# Patient Record
Sex: FEMALE | Race: WHITE | NOT HISPANIC OR LATINO | Employment: OTHER | URBAN - NONMETROPOLITAN AREA
[De-identification: names, ages, dates, MRNs, and addresses within clinical notes are randomized per-mention and may not be internally consistent; named-entity substitution may affect disease eponyms.]

---

## 2017-11-26 ENCOUNTER — HOSPITAL ENCOUNTER (OUTPATIENT)
Dept: RADIOLOGY | Facility: OTHER | Age: 27
End: 2017-11-26
Attending: FAMILY MEDICINE

## 2017-11-26 ENCOUNTER — HISTORY (OUTPATIENT)
Dept: FAMILY MEDICINE | Facility: OTHER | Age: 27
End: 2017-11-26

## 2017-11-26 ENCOUNTER — OFFICE VISIT - GICH (OUTPATIENT)
Dept: FAMILY MEDICINE | Facility: OTHER | Age: 27
End: 2017-11-26

## 2017-11-26 DIAGNOSIS — M25.571 PAIN IN RIGHT ANKLE: ICD-10-CM

## 2017-11-26 DIAGNOSIS — S93.491A SPRAIN OF OTHER LIGAMENT OF RIGHT ANKLE, INITIAL ENCOUNTER: ICD-10-CM

## 2017-12-28 NOTE — PROGRESS NOTES
"Patient Information     Patient Name MRN Sex Hermila Valentin [id number] Female 1990      Progress Notes by Rosalva Stokes DO at 2017  3:45 PM     Author:  Rosalva Stokes DO Service:  (none) Author Type:  PHYS- Osteopathic     Filed:  2017  4:43 PM Encounter Date:  2017 Status:  Signed     :  Rosalva Stokes DO (PHYS- Osteopathic)            SUBJECTIVE:  Hermila Bennett is a 27 y.o. female who presents for ankle pain.    HUMBLE Cleaning is here with her \"best friend\" for R ankle pain x 2 days.  Twisted same ankle ~2 months ago; stepping into a hole.  Yesterday - was walking along (flat surface) and ankle gave out/inverted underneath her.  She was able to put ~50% of her body weight on it immediately; and continues to be about the same.  Most pain at Lateral malleolus.  Ice on it yesterday.    No Known Allergies,   No current outpatient prescriptions on file prior to visit.     No current facility-administered medications on file prior to visit.     and No past medical history on file.    REVIEW OF SYSTEMS:  Review of Systems   All other systems reviewed and are negative.      OBJECTIVE:  /82  Pulse 76  Temp 98.5  F (36.9  C) (Tympanic)  Ht 1.645 m (5' 4.75\")  Wt 104.1 kg (229 lb 6.4 oz)  LMP 2017 (Exact Date)  Breastfeeding? No  BMI 38.47 kg/m2    EXAM:   Physical Exam   Constitutional: She is well-developed, well-nourished, and in no distress.   HENT:   Head: Normocephalic and atraumatic.   Right Ear: External ear normal.   Left Ear: External ear normal.   Eyes: EOM are normal. Pupils are equal, round, and reactive to light.   Musculoskeletal:        Right ankle: She exhibits swelling and ecchymosis. She exhibits normal range of motion, no deformity, no laceration and normal pulse. Tenderness. Lateral malleolus and AITFL tenderness found. No medial malleolus, no CF ligament, no posterior TFL, no head of 5th metatarsal and no proximal fibula tenderness " "found.   Nursing note and vitals reviewed.    Ankle XR: No acute fracture  Radiology read: Pending  I personally reviewed images with Hermila and her \"best friend.\"    ASSESSMENT/PLAN:    ICD-10-CM    1. Acute right ankle pain M25.571 XR ANKLE 3 VIEWS RIGHT        Plan:   Reassurance provided re: no fracture.  RICE therapy.  Brace provided and encouraged for stability.  Will notify if radiology sees anything different.  Follow up if not improving.          "

## 2017-12-28 NOTE — ADDENDUM NOTE
Patient Information     Patient Name MRN Sex Hermila Valentin [id number] Female 1990      Addendum Note by Rosalva Fontenot DO at 2017  4:47 PM     Author:  Rosalva Fontenot DO Service:  (none) Author Type:  PHYS- Osteopathic     Filed:  2017  4:47 PM Encounter Date:  2017 Status:  Signed     :  Rosalva Fontenot DO (PHYS- Osteopathic)       Addended by: ROSALVA FONTENOT on: 2017 04:47 PM        Modules accepted: Orders

## 2017-12-30 NOTE — NURSING NOTE
Patient Information     Patient Name MRN Hermila Shoemaker [id number] Female 1990      Nursing Note by Yuni Pearl at 2017  3:45 PM     Author:  Yuni Pearl Service:  (none) Author Type:  (none)     Filed:  2017  4:10 PM Encounter Date:  2017 Status:  Signed     :  Yuni Pearl            Patient presents today for right ankle injury. Patient was walking home last night and ankle gave out on her, it kind of buckled, as she states. Patients foot and ankle are swollen, and she is having trouble walking on it.    Yuni Pearl LPN..............2017 4:03 PM

## 2018-01-25 VITALS
DIASTOLIC BLOOD PRESSURE: 82 MMHG | BODY MASS INDEX: 38.22 KG/M2 | HEART RATE: 76 BPM | SYSTOLIC BLOOD PRESSURE: 114 MMHG | WEIGHT: 229.4 LBS | HEIGHT: 65 IN | TEMPERATURE: 98.5 F

## 2018-02-08 ENCOUNTER — DOCUMENTATION ONLY (OUTPATIENT)
Dept: FAMILY MEDICINE | Facility: OTHER | Age: 28
End: 2018-02-08

## 2019-01-23 ENCOUNTER — OFFICE VISIT (OUTPATIENT)
Dept: FAMILY MEDICINE | Facility: OTHER | Age: 29
End: 2019-01-23
Attending: FAMILY MEDICINE
Payer: COMMERCIAL

## 2019-01-23 VITALS
BODY MASS INDEX: 43.05 KG/M2 | DIASTOLIC BLOOD PRESSURE: 64 MMHG | TEMPERATURE: 97.8 F | HEIGHT: 63 IN | WEIGHT: 243 LBS | HEART RATE: 80 BPM | SYSTOLIC BLOOD PRESSURE: 118 MMHG

## 2019-01-23 DIAGNOSIS — M54.6 ACUTE LEFT-SIDED THORACIC BACK PAIN: Primary | ICD-10-CM

## 2019-01-23 PROCEDURE — 99213 OFFICE O/P EST LOW 20 MIN: CPT | Performed by: FAMILY MEDICINE

## 2019-01-23 PROCEDURE — G0463 HOSPITAL OUTPT CLINIC VISIT: HCPCS | Performed by: FAMILY MEDICINE

## 2019-01-23 ASSESSMENT — PAIN SCALES - GENERAL: PAINLEVEL: MODERATE PAIN (5)

## 2019-01-23 ASSESSMENT — ENCOUNTER SYMPTOMS
MYALGIAS: 1
BACK PAIN: 1
NECK PAIN: 0
JOINT SWELLING: 0
CONSTITUTIONAL NEGATIVE: 1
ARTHRALGIAS: 0
NECK STIFFNESS: 0

## 2019-01-23 ASSESSMENT — MIFFLIN-ST. JEOR: SCORE: 1801.37

## 2019-01-23 NOTE — PROGRESS NOTES
"Nursing Notes:   Cata Fontenot LPN  1/23/2019 10:30 AM  Signed  Patient presents to clinic with pain on back near left shoulder blade area. She states that no known injury and this pain started Monday.  Cata Carr ....................  1/23/2019   10:25 AM      SUBJECTIVE:     HUMBLE   Hermila Bennett is a 28 year old female who presents with her mother to clinic today for pain in her left scapular region. 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 She does not recall any other injury to the area. She explains the pain comes and goes throughout the day. She has not noticed if any particular movements make it worse. She has not had any back pain prior to this. She has taken OTC Ibuprofen to help with symptoms but has not tried stretching.   Social History     Social History Narrative    Student at [employer] studying early childhood.     Works part-time at  center and as a nanny    Lives with parents and sister in [place]. No significant other.       Review of Systems   Constitutional: Negative.    Musculoskeletal: Positive for back pain and myalgias. Negative for arthralgias, gait problem, joint swelling, neck pain and neck stiffness.        OBJECTIVE:     /64   Pulse 80   Temp 97.8  F (36.6  C)   Ht 1.6 m (5' 3\")   Wt 110.2 kg (243 lb)   LMP 12/31/2018   Breastfeeding? No   BMI 43.05 kg/m    Body mass index is 43.05 kg/m .  Physical Exam  General: Pleasant, in no apparent distress.   Musculoskeletal: Tenderness to palpation of posterolateral ribs around the level of T7. No changes in ROM of back of shoulders.   Psych: Appropriate mood and affect.       ASSESSMENT/PLAN:     1. Acute left-sided thoracic back pain      Discussed with patient that she is likely experiencing pain due to a subluxed rib from lifting at work. Encouraged use of OTC tylenol or ibuprofen for symptomatic relief. Discussed " with patient that she may benefit from working with a chiropractor.      Reminded patient that she is overdue for a pap smear. Patient will schedule a physical with a pap smear at her convenience.     LEONCIO Stephens  I was present with the physician assistant student who participated in the service and in the documentation of this note.  I have verified the history and personally performed a physical exam and medical decision making, and have verified the content of the note, which accurately reflects my assessment of the patient and the plan of care.    Marry Storey MD  [facility]

## 2019-01-23 NOTE — NURSING NOTE
Patient presents to clinic with pain on back near left shoulder blade area. She states that no known injury and this pain started Monday.  Cata Carr ....................  1/23/2019   10:25 AM

## 2019-01-29 ENCOUNTER — THERAPY VISIT (OUTPATIENT)
Dept: CHIROPRACTIC MEDICINE | Facility: OTHER | Age: 29
End: 2019-01-29
Attending: CHIROPRACTOR
Payer: COMMERCIAL

## 2019-01-29 DIAGNOSIS — M54.6 ACUTE LEFT-SIDED THORACIC BACK PAIN: ICD-10-CM

## 2019-01-29 DIAGNOSIS — M99.02 SEGMENTAL AND SOMATIC DYSFUNCTION OF THORACIC REGION: Primary | ICD-10-CM

## 2019-01-29 DIAGNOSIS — M62.838 MUSCLE SPASM: ICD-10-CM

## 2019-01-29 DIAGNOSIS — M99.01 SEGMENTAL AND SOMATIC DYSFUNCTION OF CERVICAL REGION: ICD-10-CM

## 2019-01-29 DIAGNOSIS — M54.2 CERVICALGIA: ICD-10-CM

## 2019-01-29 PROCEDURE — G0463 HOSPITAL OUTPT CLINIC VISIT: HCPCS | Performed by: CHIROPRACTOR

## 2019-01-29 PROCEDURE — 99202 OFFICE O/P NEW SF 15 MIN: CPT | Mod: 25 | Performed by: CHIROPRACTOR

## 2019-01-29 PROCEDURE — 98940 CHIROPRACT MANJ 1-2 REGIONS: CPT | Mod: AT | Performed by: CHIROPRACTOR

## 2019-01-29 NOTE — PROGRESS NOTES
PATIENT:  Hermila Bennett is a 28 year old  female presenting for Left midback and neck pain referred by Marry Storey MD.  PROBLEM:   Date of Initial Visit for this Episode:  1/29/2019 1/29/2019  Visit #1/3    SUBJECTIVE / HPI:   Description and onset:  Sharp lower scapular pain x 10 days. Tried recommendations after visit last week with Dr. Storey in past week, but staying the same.    Duration and Frequency of Pain:  Intermittent  Radiation of pain: into upper neck across base of skull  Pain rated at it's worst: 8/10  Pain rated currently:  7-8/10, sharp, miserable, gnawing, fierce, piercing  Pain course: Not changing last week rated pain 5/10 moderate.  No improvement with treatment.    Worse with:  Sitting, sleeping, lifting kids sometimes.   Improved by:  Nothing  Additional Features: No numbness or weakness.     Other Health Care Providers seen for this: Cordelia  Previous treatment: no help with ice, heat, ibuprofen, tylenol.  Previous injury:none    See flowsheets in chart for details.  1/29/2019  Neck index Not assessed today.     Oswestry Back index 30%    Functional limitations:  Reduce pain, be able to lift kids, sit up to an 1 hr., sleep 8 hrs/night    Exercise habits:  Not much  Sleeping habits: 8 hrs/night (pre-injury)  Past D.C. Care: no       Health History as reported by the patient: Good    PAST MEDICAL HISTORY:  No past medical history on file.    PAST SURGICAL HISTORY:  Past Surgical History:   Procedure Laterality Date     LAPAROSCOPIC APPENDECTOMY  05/05/2012     Dr. Hebert       ALLERGIES:  No Known Allergies    CURRENT MEDICATIONS:  No current outpatient medications on file.       SOCIAL HISTORY:  Marital Status: single (never ).  Children: no.  Occupation: nanny  Alcohol use:Occassional.  Tobacco use: Smoker: no.  Are you or have you used illicit drugs:  no.    FAMILY HISTORY:  Family History   Problem Relation Age of Onset     Family History Negative Mother         Good  "Health     Heart Disease Father 50        Heart Disease,MI     Family History Negative Sister         Good Health     Breast Cancer No family hx of         Cancer-breast       Patient Active Problem List   Diagnosis     Obesity         ROS:  The patient denies any fevers, chills, nausea, vomiting, diarrhea, constipation,dysuria, hematuria, or urinary hesitancy or incontinence.  No shortness of breath, chest pain, or rashes.    OBJECTIVE:    DIAGNOSTICS:  No warranted current spinal imaging taken.     PHYSICAL EXAM:     GENERAL APPEARANCE: healthy, alert and no distress   SKIN: no suspicious lesions or rashes  NEURO: cranial nerves 2-12 intact, normal gait  PSYCH:  mentation appears normal and affect normal/bright    MUSCULOSKELETAL:   Posture: mild forward head, rounded shoulders bilateral    Cervical  AROM:  Normal except 70/85 L Rotation +neck pain    Tenderness: L C1, R C5   Muscle spasm:  Suboccipital,  trapezius  Motion restriction: C1 in left lateral flexion, C5 in left rotation   -Maximal Foraminal Compression: on L \"improves back pain\"; negative right    - Shoulder Depression: improves   - Distraction: improves     Thoracic  AROM:   Restricted 30/45 LR +sharp left scapular pain   +Kemps: +T4-7  Tenderness: T3-4, T7-8, PRimary is L 7th costovertebral jt.  Muscle spasm:  Trapezius, rhomboids, extensor paraspinals  Motion restriction: T4 in extension and left rotation, T7 in right rotation and extension     +Joint asymmetry and restriction: C1 RLF, C5 R, T4 R/flex, T7 L/flex and  L 7th costovertebral jt. 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   No diagnosis found.    PLAN    History and Examination    Procedures:  Modalities:  None performed this visit    CMT:  99727 Chiropractic manipulative treatment 1-2 regions performed "   Cervical: Diversified, C1 , C5 , Supine  Thoracic: Diversified, T4, T7, Prone    Therapeutic procedures:  None  Stretches - Reviewed stretches doing general ROM done on examtoday .   Gave patient Ice instructions post adjustment, and instructions for acute care    Response to Treatment:  Reduction in symptoms as reported by patient    Prognosis: Excellent    1/29/2019 Plan of Care:  2-3 visits of Chiropractic Care including Spinal Adjustments and/or physiotherapy and active rehabilitation, to include exercises in the office and/or at home to meet care plan goals.     Frequency: 2xweek for up to 2 weeks. A reevaluation would be clinically appropriate in 2-3 visits, to determine progress and further course of care.    POC discussed and patient agreeable to plan of care.      1/29/2019 Goals:   See Assessment.   Patient will report improved pain.   Patient will demonstrate an improved ability to complete Activities of Daily Living  as shown by a reported 10-30% reduced score on neck and/or back index.    Patient will demonstrate improved ROM.        INSTRUCTIONS   Patient Instructions   Use ice/heat, stretching ROM neck and upper back.  Return 1-2 more visits within next week.

## 2019-01-29 NOTE — Clinical Note
Christiano Tuttle, Excellent referral for chiropractic adjustments!  Should just take 1-3 visits. Thank you, Qing

## 2019-01-30 ENCOUNTER — OFFICE VISIT (OUTPATIENT)
Dept: OBGYN | Facility: OTHER | Age: 29
End: 2019-01-30
Attending: OBSTETRICS & GYNECOLOGY
Payer: COMMERCIAL

## 2019-01-30 VITALS
SYSTOLIC BLOOD PRESSURE: 126 MMHG | BODY MASS INDEX: 42.8 KG/M2 | WEIGHT: 241.56 LBS | HEART RATE: 60 BPM | HEIGHT: 63 IN | DIASTOLIC BLOOD PRESSURE: 74 MMHG

## 2019-01-30 DIAGNOSIS — Z00.00 ANNUAL PHYSICAL EXAM: Primary | ICD-10-CM

## 2019-01-30 DIAGNOSIS — Z23 NEED FOR TETANUS BOOSTER: ICD-10-CM

## 2019-01-30 DIAGNOSIS — Z12.4 SCREENING FOR CERVICAL CANCER: ICD-10-CM

## 2019-01-30 PROCEDURE — G0123 SCREEN CERV/VAG THIN LAYER: HCPCS | Performed by: OBSTETRICS & GYNECOLOGY

## 2019-01-30 PROCEDURE — 99385 PREV VISIT NEW AGE 18-39: CPT | Mod: 25 | Performed by: OBSTETRICS & GYNECOLOGY

## 2019-01-30 PROCEDURE — 90715 TDAP VACCINE 7 YRS/> IM: CPT | Performed by: OBSTETRICS & GYNECOLOGY

## 2019-01-30 PROCEDURE — 90471 IMMUNIZATION ADMIN: CPT | Performed by: OBSTETRICS & GYNECOLOGY

## 2019-01-30 ASSESSMENT — PAIN SCALES - GENERAL: PAINLEVEL: NO PAIN (0)

## 2019-01-30 ASSESSMENT — MIFFLIN-ST. JEOR: SCORE: 1794.85

## 2019-01-30 NOTE — PROGRESS NOTES
"Annual Exam    HPI:    Hermila Bennett is a 28 year old , here for well woman exam.  She has no concerns. She was having back pain but it improved after seeing a chiropractor. She has regular monthly menses. She is not currently sexually active, although has been in the past. Denies concerns for STIs or desire for testing. She is not on contraception and declines a need to start.     OBHx  Obstetric History       T0      L0     SAB0   TAB0   Ectopic0   Multiple0   Live Births0           PMHx: obesity  PSHx:   Past Surgical History:   Procedure Laterality Date     LAPAROSCOPIC APPENDECTOMY  2012     Dr. Hebert      Meds:   No current outpatient medications on file.     No current facility-administered medications for this visit.      Allergies:  NKDA    SocHx:   Social History     Tobacco Use     Smoking status: Never Smoker     Smokeless tobacco: Never Used   Substance Use Topics     Alcohol use: Yes     Comment: occasionally     Drug use: No     Lives with her mom and sister. 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    FamHx:   Family History   Problem Relation Age of Onset     Family History Negative Mother         Good Health     Heart Disease Father 50        Heart Disease,MI     Family History Negative Sister         Good Health     Breast Cancer No family hx of         Cancer-breast        ROS: 10-Point ROS negative except as noted in HPI    Physical Exam  /74 (BP Location: Right arm, Patient Position: Sitting, Cuff Size: Adult Large)   Pulse 60   Ht 1.6 m (5' 3\")   Wt 109.6 kg (241 lb 9 oz)   LMP 2018   Breastfeeding? No   BMI 42.79 kg/m    Gen: Well-appearing, NAD  HEENT: Normocephalic, atraumatic  Neck: Thyroid is not enlarged, no appreciable masses palpated. Non-tender  CV:  RRR, no m/r/g auscultated  Pulm: CTAB, no w/r/r auscultated  Abd: Soft, non-tender, non-distended, obese  Ext: No LE edema, extremities warm and well perfused    Breast: Symmetric, no nipple " discharge or retraction, no axillary lymphadenopathy, no palpable nodules or masses bilaterally. Bilaterally dense breast tissue.     Pelvic:  Normal appearing external female genitalia. Normal hair distribution. Vagina is without lesions. Small white discharge. Cervix normal, no lesions, no cervical motion tenderness. Uterus is small, mobile, non-tender. No adnexal tenderness or masses. Bimanual exam limited by body habitus    Pap collected      --Ideal BMI: 18.5-24.9  Current BMI: Body mass index is 42.79 kg/m .  --Underweight = <18.5  --Normal weight = 18.5-24.9  --Overweight = 25-29.9  --Obesity = >30    Assessment/Plan  Hermila Bennett is a 28 year old  female here for annual exam.     1. Routine Health Maintenance:   -Due for Tdap, none since     2. Cervical cancer screening: pap collected today. If NILM, next due in 5 years    Follow Up: annually or sooner william Bernstein MD  OB/GYN  2019 10:34 AM

## 2019-01-30 NOTE — NURSING NOTE
"Chief Complaint   Patient presents with     Physical       Initial /74 (BP Location: Right arm, Patient Position: Sitting, Cuff Size: Adult Large)   Pulse 60   Ht 1.6 m (5' 3\")   Wt 109.6 kg (241 lb 9 oz)   LMP 12/31/2018   Breastfeeding? No   BMI 42.79 kg/m   Estimated body mass index is 42.79 kg/m  as calculated from the following:    Height as of this encounter: 1.6 m (5' 3\").    Weight as of this encounter: 109.6 kg (241 lb 9 oz).  Medication Reconciliation: tena Ring LPN     Prior to injection, verified patient identity using patient's name and date of birth.  Due to injection administration, patient instructed to remain in clinic for 15 minutes  afterwards, and to report any adverse reaction to me immediately.    boostrix     Drug Amount Wasted:  None.  Vial/Syringe: Syringe  Expiration Date:  05/03/20    "

## 2019-02-04 ENCOUNTER — THERAPY VISIT (OUTPATIENT)
Dept: CHIROPRACTIC MEDICINE | Facility: OTHER | Age: 29
End: 2019-02-04
Attending: CHIROPRACTOR
Payer: COMMERCIAL

## 2019-02-04 DIAGNOSIS — M62.838 MUSCLE SPASM: ICD-10-CM

## 2019-02-04 DIAGNOSIS — M99.01 SEGMENTAL AND SOMATIC DYSFUNCTION OF CERVICAL REGION: ICD-10-CM

## 2019-02-04 DIAGNOSIS — M99.02 SEGMENTAL AND SOMATIC DYSFUNCTION OF THORACIC REGION: Primary | ICD-10-CM

## 2019-02-04 DIAGNOSIS — M54.2 CERVICALGIA: ICD-10-CM

## 2019-02-04 DIAGNOSIS — M54.6 ACUTE LEFT-SIDED THORACIC BACK PAIN: ICD-10-CM

## 2019-02-04 LAB
COPATH REPORT: NORMAL
PAP: NORMAL

## 2019-02-04 PROCEDURE — 98940 CHIROPRACT MANJ 1-2 REGIONS: CPT | Mod: AT | Performed by: CHIROPRACTOR

## 2019-02-04 NOTE — PROGRESS NOTES
"2/4/2019   Visit #:  2    Subjective:  Hermila Bennett is a 28 year old female who is seen in f/u up for:      Segmental and somatic dysfunction of thoracic region  Acute left-sided thoracic back pain  Segmental and somatic dysfunction of cervical region  Cervicalgia  Muscle spasm.     Since last visit on 1/29/2019,  Hermila Bennett reports: \"She feels better than she has in a long time\". She's now able to sleep well for 8 or more hours.    Area of chief complaint:  Cervical and Thoracic :  Symptoms are graded at 0/10.  Since last visit the patient feels that they are nearly 100 percent   from last visit.       Objective:  The following was observed:    P: palpatory tenderness T-spine paraspinal and focal levels:    A: intersegmental range of motion restriction: cervical, thoracic  R: motion palpation notes restricted motion cervical, thoracic  T: hypertonicity at: T-spine paraspinal and Traps Bilaterally    +Joint asymmetry and restriction: C1 RLF, C5 R, T4 R/flex, T8 L/flex      Assessment: She responded very quickly in symptom relief after first visit, will plan for 1 more visit to reduce muscle spasm.    Diagnoses:      1. Segmental and somatic dysfunction of thoracic region    2. Acute left-sided thoracic back pain    3. Segmental and somatic dysfunction of cervical region    4. Cervicalgia    5. Muscle spasm        Patient's condition:  Patient had restrictions pre-manipulation    Treatment effectiveness:  Post manipulation there is better intersegmental movement, Patients symptoms are getting better, Hypertonicity is decreasing and ADL are improving      Procedures:  CMT:  39306 Chiropractic manipulative treatment 1-2 regions performed   Cervical: Diversified, C1 , C5 , Supine  Thoracic: Diversified, T4, T8, Prone    Modalities:  None performed this visit    Therapeutic procedures:  None    Response to Treatment  Reduction in symptoms as reported by patient    Prognosis: Excellent    Progress towards Goals: " Patient is making progress towards the goal.     Recommendations:  Patient Instructions   Use ice/heat, stretching ROM neck and upper back.  Return 1-2 more visits within next week.

## 2019-02-11 ENCOUNTER — THERAPY VISIT (OUTPATIENT)
Dept: CHIROPRACTIC MEDICINE | Facility: OTHER | Age: 29
End: 2019-02-11
Attending: CHIROPRACTOR
Payer: COMMERCIAL

## 2019-02-11 DIAGNOSIS — M99.02 SEGMENTAL AND SOMATIC DYSFUNCTION OF THORACIC REGION: Primary | ICD-10-CM

## 2019-02-11 DIAGNOSIS — M99.01 SEGMENTAL AND SOMATIC DYSFUNCTION OF CERVICAL REGION: ICD-10-CM

## 2019-02-11 PROCEDURE — 99212 OFFICE O/P EST SF 10 MIN: CPT | Mod: 25 | Performed by: CHIROPRACTOR

## 2019-02-11 PROCEDURE — 98940 CHIROPRACT MANJ 1-2 REGIONS: CPT | Mod: AT | Performed by: CHIROPRACTOR

## 2019-02-11 PROCEDURE — G0463 HOSPITAL OUTPT CLINIC VISIT: HCPCS | Performed by: CHIROPRACTOR

## 2019-02-11 NOTE — PROGRESS NOTES
"2/11/2019   Visit #:  3    Subjective:  Hermila Bennett is a 28 year old female who is seen in f/u up for:      Segmental and somatic dysfunction of thoracic region  Segmental and somatic dysfunction of cervical region.     Since last visit on 2/4/2019,  Hermila Bennett reports: She has no further left midback pain and can do all daily activities. \"It is so nice to have my life back\".    Area of chief complaint:  Cervical and Thoracic :  Symptoms are graded at 0/10.  Since last visit the patient feels that they are 100 percent improved from last visit. Initial 1/29/19 30%.    Oswestry (JIL) Questionnaire    OSWESTRY DISABILITY INDEX 2/11/2019   Count 10   Sum 0   Oswestry Score (%) 0   Some recent data might be hidden            Objective:    2/11/2019 Brief re-eval  The following was observed:    P: palpatory tenderness: slight at noted levels   A: intersegmental range of motion restriction: cervical, thoracic  R: motion palpation notes restricted motion cervical, thoracic   AROM: mild left lateral flexion of cervical restricted compared  to right.   T: hypertonicity at: T-spine paraspinal and at focal cervical levels:  Bilaterally  S:  MFC: negative to right and left.  +Joint asymmetry and restriction: C1 RLF, C5 R, T4 R/flex, T8 L/flex      Assessment: She responded very quickly in symptom relief after first visit, muscle spasm reduced to focal levels of segmental dysfunction. Discharging to self management with stretching and strengthening postural muscles.      Diagnoses:      1. Segmental and somatic dysfunction of thoracic region    2. Segmental and somatic dysfunction of cervical region        Patient's condition: Patient had restrictions pre-manipulation, IMPROVED FUNCTION AND SYMPTOMS.    Treatment effectiveness:  Post manipulation there is better intersegmental movement, Patients symptoms are better, Hypertonicity is decreasing and ADLs are not interfered with.    Procedures:  CMT:  06784 Chiropractic " manipulative treatment 1-2 regions performed   Cervical: Diversified, C1 , C5 , Supine  Thoracic: Diversified, T4, T8, Prone    Modalities:  None performed this visit    Therapeutic procedures:  None    Response to Treatment  Reduction in symptoms and improved restriction as reported by patient    Prognosis: Excellent    Progress towards Goals: Patient has met all goals.     Recommendations:   Patient Instructions   She responded very quickly in symptom relief after first visit, muscle spasm reduced to focal levels of segmental dysfunction. Discharging to self management with stretching and strengthening postural muscles.

## 2019-02-11 NOTE — PATIENT INSTRUCTIONS
She responded very quickly in symptom relief after first visit, muscle spasm reduced to focal levels of segmental dysfunction. Discharging to self management with stretching and strengthening postural muscles.

## 2021-06-16 ENCOUNTER — OFFICE VISIT (OUTPATIENT)
Dept: FAMILY MEDICINE | Facility: OTHER | Age: 31
End: 2021-06-16
Attending: NURSE PRACTITIONER

## 2021-06-16 VITALS
BODY MASS INDEX: 46.87 KG/M2 | HEART RATE: 81 BPM | HEIGHT: 63 IN | RESPIRATION RATE: 16 BRPM | TEMPERATURE: 98.6 F | OXYGEN SATURATION: 98 % | DIASTOLIC BLOOD PRESSURE: 86 MMHG | SYSTOLIC BLOOD PRESSURE: 134 MMHG | WEIGHT: 264.5 LBS

## 2021-06-16 DIAGNOSIS — R21 FACIAL RASH: Primary | ICD-10-CM

## 2021-06-16 PROCEDURE — 99213 OFFICE O/P EST LOW 20 MIN: CPT | Performed by: NURSE PRACTITIONER

## 2021-06-16 RX ORDER — CEPHALEXIN 500 MG/1
500 CAPSULE ORAL 2 TIMES DAILY
Qty: 14 CAPSULE | Refills: 0 | Status: SHIPPED | OUTPATIENT
Start: 2021-06-16 | End: 2021-06-23

## 2021-06-16 ASSESSMENT — MIFFLIN-ST. JEOR: SCORE: 1888.89

## 2021-06-16 ASSESSMENT — PAIN SCALES - GENERAL: PAINLEVEL: SEVERE PAIN (6)

## 2021-06-16 NOTE — NURSING NOTE
"Chief Complaint   Patient presents with     Derm Problem     left side of face     Rash on Left side of face just suddenly came on about an hour ago. Patient denies using different make up, laundry detergent, lotion.    /86 (BP Location: Right arm, Patient Position: Sitting, Cuff Size: Adult Large)   Pulse 81   Temp 98.6  F (37  C) (Tympanic)   Resp 16   Ht 1.6 m (5' 3\")   Wt 120 kg (264 lb 8 oz)   LMP 06/02/2021 (Exact Date)   SpO2 98%   Breastfeeding No   BMI 46.85 kg/m        Medication Reconciliation: complete    Clair Galindo LPN    "

## 2021-06-16 NOTE — PROGRESS NOTES
"HPI:    Hermila Bennett is a 30 year old female  who presents to [facility] today for facial rash.    Left cheek with rash that started a few hours ago.  Rash is itchy and painful.    No new food, medications, makeup, facial wash soap, lotion, detergents, sunscreen, etc  She is confident she did not get stung or bit by any insects.  No known tick bites.  No facial weakness, numbness or tingling.    No fevers or feeling feverish.  No headaches.  No vision change.  No sinus congestion or pain.  No dental pain or painful chewing.    No OTC medications or topical treatments        History reviewed. No pertinent past medical history.  Past Surgical History:   Procedure Laterality Date     LAPAROSCOPIC APPENDECTOMY  05/05/2012     Dr. Hebert     Social History     Tobacco Use     Smoking status: Never Smoker     Smokeless tobacco: Never Used   Substance Use Topics     Alcohol use: Yes     Comment: occasionally     No current outpatient medications on file.     No Known Allergies      Past medical history, past surgical history, current medications and allergies reviewed and accurate to the best of my knowledge.        ROS:  Refer to [facility]    /86 (BP Location: Right arm, Patient Position: Sitting, Cuff Size: Adult Large)   Pulse 81   Temp 98.6  F (37  C) (Tympanic)   Resp 16   Ht 1.6 m (5' 3\")   Wt 120 kg (264 lb 8 oz)   LMP 06/02/2021 (Exact Date)   SpO2 98%   Breastfeeding No   BMI 46.85 kg/m      EXAM:  General Appearance: Well appearing adult female, appropriate appearance for age. No acute distress  Eyes: conjunctivae normal without erythema or irritation, corneas clear, no drainage or crusting, no eyelid swelling, pupils equal   Orophayrnx: voice clear.    Nose:  No noted drainage or congestion   Neck: supple without adenopathy  Respiratory: normal chest wall and respirations.  Normal effort.  No cough appreciated.  Musculoskeletal:  Equal movement of bilateral upper extremities.  Equal movement of " bilateral lower extremities.  Normal gait.    Dermatological: left facial cheek with 3.5 cm round erythematous non raised skin area with mild tenderness to palpation along the nose border with mild firmness, no palpable induration or fluctuance, surrounding multiple black heads on nose and few acne pustules on chin.    Psychological: normal affect, alert, oriented, and pleasant.           ASSESSMENT/PLAN:    I have reviewed the nursing notes.  I have reviewed the findings, diagnosis, plan and need for follow up with the patient.    1. Facial rash    - cephALEXin (KEFLEX) 500 MG capsule; Take 1 capsule (500 mg) by mouth 2 times daily for 7 days  Dispense: 14 capsule; Refill: 0    Possible mild bacterial skin infection on left facial cheek.    No known allergic causes for rash.  No known insect bites/stings or tick bites.    Recommend OTC Benadryl PRN itching  Recommend OTC Ibuprofen PRN pain/swelling    Discussed warning signs/symptoms indicative of need to f/u  Follow up if symptoms persist or worsen or concerns      I explained my diagnostic considerations and recommendations to the patient, who voiced understanding and agreement with the treatment plan. All questions were answered. We discussed potential side effects of any prescribed or recommended therapies, as well as expectations for response to treatments.

## 2021-08-24 ENCOUNTER — ALLIED HEALTH/NURSE VISIT (OUTPATIENT)
Dept: FAMILY MEDICINE | Facility: OTHER | Age: 31
End: 2021-08-24
Attending: FAMILY MEDICINE
Payer: OTHER GOVERNMENT

## 2021-08-24 DIAGNOSIS — J02.9 SORE THROAT: Primary | ICD-10-CM

## 2021-08-24 LAB — SARS-COV-2 RNA RESP QL NAA+PROBE: NEGATIVE

## 2021-08-24 PROCEDURE — U0005 INFEC AGEN DETEC AMPLI PROBE: HCPCS | Mod: ZL

## 2021-08-24 PROCEDURE — C9803 HOPD COVID-19 SPEC COLLECT: HCPCS

## 2021-10-09 ENCOUNTER — HEALTH MAINTENANCE LETTER (OUTPATIENT)
Age: 31
End: 2021-10-09

## 2022-09-17 ENCOUNTER — HEALTH MAINTENANCE LETTER (OUTPATIENT)
Age: 32
End: 2022-09-17

## 2023-01-28 ENCOUNTER — HEALTH MAINTENANCE LETTER (OUTPATIENT)
Age: 33
End: 2023-01-28

## 2024-01-26 ENCOUNTER — OFFICE VISIT (OUTPATIENT)
Dept: FAMILY MEDICINE | Facility: OTHER | Age: 34
End: 2024-01-26
Attending: STUDENT IN AN ORGANIZED HEALTH CARE EDUCATION/TRAINING PROGRAM

## 2024-01-26 VITALS
TEMPERATURE: 98.1 F | OXYGEN SATURATION: 97 % | HEIGHT: 63 IN | SYSTOLIC BLOOD PRESSURE: 122 MMHG | RESPIRATION RATE: 16 BRPM | BODY MASS INDEX: 47.32 KG/M2 | WEIGHT: 267.1 LBS | DIASTOLIC BLOOD PRESSURE: 78 MMHG | HEART RATE: 77 BPM

## 2024-01-26 DIAGNOSIS — R21 RASH: Primary | ICD-10-CM

## 2024-01-26 PROCEDURE — 99213 OFFICE O/P EST LOW 20 MIN: CPT | Performed by: STUDENT IN AN ORGANIZED HEALTH CARE EDUCATION/TRAINING PROGRAM

## 2024-01-26 RX ORDER — PREDNISONE 20 MG/1
TABLET ORAL
Qty: 20 TABLET | Refills: 0 | Status: SHIPPED | OUTPATIENT
Start: 2024-01-26

## 2024-01-26 RX ORDER — TRIAMCINOLONE ACETONIDE 1 MG/G
OINTMENT TOPICAL 2 TIMES DAILY
Qty: 80 G | Refills: 1 | Status: SHIPPED | OUTPATIENT
Start: 2024-01-26 | End: 2024-02-02

## 2024-01-26 ASSESSMENT — PAIN SCALES - GENERAL: PAINLEVEL: NO PAIN (0)

## 2024-01-26 NOTE — PATIENT INSTRUCTIONS
Rash    Triamcinolone ointment twice a day for itching/rash.     If no improvement, start oral steroid tomorrow or the following days. Take in the morning with food.     Zyrtec, anti-histamine.     Benadryl at bedtime.     Follow up with PCP or dermatology if not improving. ([facility]).     [facility] if worsening.

## 2024-01-26 NOTE — PROGRESS NOTES
"  Assessment & Plan     (R21) Rash  (primary encounter diagnosis)    Comment: Rash either dermatitis or potentially the start of vasculitis.  Symptoms started this morning.  No sore throat, discussed prep testing, declined at this time.  That is unlikely.     Plan: triamcinolone (KENALOG) 0.1 % external         ointment, predniSONE (DELTASONE) 20 MG tablet       Plan to try triamcinolone ointment, twice a day for up to 7 days to help with itching/rash.  Zyrtec and/or Benadryl.  If no improvement between today and tomorrow, start prednisone burst and taper.  Follow-up with primary care or dermatology for persisting symptoms.  Return to [facility] or ER for worsening or changing symptoms.  She is comfortable and agreeable with this plan.      Amairani Cleaning is a 33 year old, presenting for the following health issues:  Rash (Lower bilateral legs; woke up with it today )    HPI     Patient presents today with concerns of rash on her legs.  States it is both of her legs.  Her shins and into the back of the lower part of the calf.  It does not extend into the feet.  Nor does it extend up past the lower part of the leg.  It is itchy but not painful or burning.  It started this morning.  No drainage, blisters, open areas.  She has not put anything on it yet.  No associated symptoms such as fever, cough, congestion, sore throat.     She denies new soaps, detergents, medications.       Review of Systems  Constitutional, HEENT, cardiovascular, pulmonary, gi and gu systems are negative, except as otherwise noted.      Objective    /78   Pulse 77   Temp 98.1  F (36.7  C) (Tympanic)   Resp 16   Ht 1.6 m (5' 3\")   Wt 121.2 kg (267 lb 1.6 oz)   LMP 12/28/2023 (Exact Date)   SpO2 97%   Breastfeeding No   BMI 47.31 kg/m    Body mass index is 47.31 kg/m .  Physical Exam  Skin:            Comments: Area of rash          GENERAL: alert and no distress  HENT: nose and mouth without ulcers or lesions  RESP: No " increased work of breathing  Integumentary: Diffuse macular slightly lacy lightly erythematous rash extending from the lower leg up the shin and around towards the calf bilaterally with the majority of the rash focused near just above the ankle, nonblanchable, not tender to palpation, no blisters, scabs, or open areas, no purple or dark areas, no flaky areas        Signed Electronically by: Ciarra Hilario PA-C

## 2024-01-26 NOTE — NURSING NOTE
"Chief Complaint   Patient presents with    Rash     Lower bilateral legs; woke up with it today        Initial /78   Pulse 77   Temp 98.1  F (36.7  C) (Tympanic)   Resp 16   Ht 1.6 m (5' 3\")   Wt 121.2 kg (267 lb 1.6 oz)   LMP 12/28/2023 (Exact Date)   SpO2 97%   Breastfeeding No   BMI 47.31 kg/m   Estimated body mass index is 47.31 kg/m  as calculated from the following:    Height as of this encounter: 1.6 m (5' 3\").    Weight as of this encounter: 121.2 kg (267 lb 1.6 oz).  Medication Review: complete    The next two questions are to help us understand your food security.  If you are feeling you need any assistance in this area, we have resources available to support you today.          1/26/2024   SDOH- Food Insecurity   Within the past 12 months, did you worry that your food would run out before you got money to buy more? N   Within the past 12 months, did the food you bought just not last and you didn t have money to get more? N         Health Care Directive:  Patient does not have a Health Care Directive or Living Will: Discussed advance care planning with patient; however, patient declined at this time.    Eveline Arriaga CMA      "

## 2024-02-25 ENCOUNTER — HEALTH MAINTENANCE LETTER (OUTPATIENT)
Age: 34
End: 2024-02-25

## 2025-02-24 ENCOUNTER — HOSPITAL ENCOUNTER (EMERGENCY)
Facility: OTHER | Age: 35
Discharge: HOME | End: 2025-02-24

## 2025-02-24 VITALS
DIASTOLIC BLOOD PRESSURE: 93 MMHG | WEIGHT: 230 LBS | OXYGEN SATURATION: 96 % | HEIGHT: 63 IN | RESPIRATION RATE: 16 BRPM | TEMPERATURE: 97.9 F | HEART RATE: 78 BPM | SYSTOLIC BLOOD PRESSURE: 144 MMHG | BODY MASS INDEX: 40.75 KG/M2

## 2025-02-24 DIAGNOSIS — M54.2 NECK PAIN: ICD-10-CM
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
## 2025-02-24 DIAGNOSIS — S09.90XA CLOSED HEAD INJURY, INITIAL ENCOUNTER: ICD-10-CM

## 2025-02-24 PROCEDURE — 99282 EMERGENCY DEPT VISIT SF MDM: CPT

## 2025-02-24 ASSESSMENT — COLUMBIA-SUICIDE SEVERITY RATING SCALE - C-SSRS
1. IN THE PAST MONTH, HAVE YOU WISHED YOU WERE DEAD OR WISHED YOU COULD GO TO SLEEP AND NOT WAKE UP?: NO
2. HAVE YOU ACTUALLY HAD ANY THOUGHTS OF KILLING YOURSELF IN THE PAST MONTH?: NO
6. HAVE YOU EVER DONE ANYTHING, STARTED TO DO ANYTHING, OR PREPARED TO DO ANYTHING TO END YOUR LIFE?: NO

## 2025-02-24 ASSESSMENT — VISUAL ACUITY: OU: 1

## 2025-02-24 ASSESSMENT — ACTIVITIES OF DAILY LIVING (ADL): ADLS_ACUITY_SCORE: 41

## 2025-02-24 ASSESSMENT — ENCOUNTER SYMPTOMS
HEADACHES: 1
NECK PAIN: 1

## 2025-02-24 NOTE — ED TRIAGE NOTES
Patient fell outside ont he ice at 0740 this morning, hitting the back of her head.  She does have a lump there but no bleeding.  She has complaints of a headache and nausea.  No vision changes, no blood thinners.  Took Advil at 0800.  C/o neck pain.  Placed in a C-collar.     Triage Assessment (Adult)       Row Name 02/24/25 0903          Triage Assessment    Airway WDL WDL        Respiratory WDL    Respiratory WDL WDL        Skin Circulation/Temperature WDL    Skin Circulation/Temperature WDL WDL        Cardiac WDL    Cardiac WDL WDL        Peripheral/Neurovascular WDL    Peripheral Neurovascular WDL WDL        Cognitive/Neuro/Behavioral WDL    Cognitive/Neuro/Behavioral WDL WDL

## 2025-02-24 NOTE — Clinical Note
Hermila Bennett was seen and treated in our emergency department on 2/24/2025.  She may return to work on 02/25/2025.       If you have any questions or concerns, please don't hesitate to call.      Emi Hurst APRN CNP

## 2025-02-24 NOTE — ED PROVIDER NOTES
"  History     Chief Complaint   Patient presents with    Fall     The history is provided by the patient and medical records.     Hermila Bennett is a 34 year old female who comes to the emergency department today independently, ambulatory.  Patient reports around 730 this morning she slipped on the ice and hit her head.  She did not have a loss of consciousness and was ambulatory was able to get herself off the ground following falling.  She comes in complaining of a mild headache and mild neck pain from the fall.  She has no neurological deficits.  Patient is otherwise healthy and she is not on blood thinning medications.    Allergies:  No Known Allergies    Problem List:    Patient Active Problem List    Diagnosis Date Noted    Obesity 05/05/2010     Priority: Medium        Past Medical History:    History reviewed. No pertinent past medical history.    Past Surgical History:    Past Surgical History:   Procedure Laterality Date    LAPAROSCOPIC APPENDECTOMY  05/05/2012     Dr. Hebert       Family History:    Family History   Problem Relation Age of Onset    Family History Negative Mother         Good Health    Heart Disease Father 50        Heart Disease,MI    Family History Negative Sister         Good Health    Breast Cancer No family hx of         Cancer-breast       Social History:  Marital Status:  Single [1]  Social History     Tobacco Use    Smoking status: Never    Smokeless tobacco: Never   Vaping Use    Vaping status: Never Used   Substance Use Topics    Alcohol use: Yes     Comment: occasionally    Drug use: No        Medications:    predniSONE (DELTASONE) 20 MG tablet          Review of Systems   Musculoskeletal:  Positive for neck pain.   Neurological:  Positive for headaches.   All other systems reviewed and are negative.      Physical Exam   BP: (!) 144/93  Pulse: 78  Temp: 97.9  F (36.6  C)  Resp: 16  Height: 160 cm (5' 3\")  Weight: 104.3 kg (230 lb)  SpO2: 96 %      Physical Exam  Vitals and " nursing note reviewed.   Constitutional:       General: She is not in acute distress.     Appearance: She is not ill-appearing or toxic-appearing.   HENT:      Head: Normocephalic. No raccoon eyes or Clinton's sign.      Jaw: There is normal jaw occlusion.      Right Ear: Tympanic membrane normal.      Left Ear: Tympanic membrane normal.      Nose: Nose normal.      Mouth/Throat:      Mouth: Mucous membranes are moist.   Eyes:      General: Vision grossly intact.      Extraocular Movements: Extraocular movements intact.      Pupils: Pupils are equal, round, and reactive to light.   Cardiovascular:      Rate and Rhythm: Normal rate and regular rhythm.      Pulses: Normal pulses.   Pulmonary:      Effort: Pulmonary effort is normal.   Abdominal:      General: Abdomen is flat.      Palpations: Abdomen is soft.   Musculoskeletal:         General: Normal range of motion.      Cervical back: Normal range of motion and neck supple. Tenderness present. No edema, rigidity or crepitus. Muscular tenderness present. No pain with movement. Normal range of motion.      Thoracic back: Normal.      Lumbar back: Normal.   Lymphadenopathy:      Cervical: No cervical adenopathy.   Skin:     General: Skin is warm.      Capillary Refill: Capillary refill takes less than 2 seconds.   Neurological:      General: No focal deficit present.      Mental Status: She is alert and oriented to person, place, and time.      GCS: GCS eye subscore is 4. GCS verbal subscore is 5. GCS motor subscore is 6.      Cranial Nerves: Cranial nerves 2-12 are intact.      Sensory: Sensation is intact.      Motor: Motor function is intact.      Coordination: Coordination is intact.      Gait: Gait is intact.   Psychiatric:         Attention and Perception: Attention normal.         Mood and Affect: Mood normal.         Behavior: Behavior normal. Behavior is cooperative.         ED Course         No results found for this or any previous visit (from the past 24  hours).    Medications - No data to display    Assessments & Plan (with Medical Decision Making)  Hermila Bennett is a 34 year old female who comes to the emergency department today independently, ambulatory.  Patient reports around 730 this morning she slipped on the ice and hit her head.  She did not have a loss of consciousness and was ambulatory was able to get herself off the ground following falling.  She comes in complaining of a mild headache and mild neck pain from the fall.  She has no neurological deficits.  Patient is otherwise healthy and she is not on blood thinning medications.  Patient is alert oriented nondistressed without any neurological deficits.  Very mild paraspinal tenderness cervical spine no deformities or step-offs noted with physical exam.  Cervical range of motion.  She has no nausea vomiting, denies vision changes.no midline tenderness.  Ambulatory and she is sitting comfortably in the emergency department. No appreciable scalp hematoma.   She has been in the waiting room for over three hours and has no increase in symptoms. She is vitally stable.   Low risk mechanism of injury.   Harrington c-spine rule/CT head rule:  low risk.   Discussed evaluation with patient, re-assuring physical examination, options for imaging. With shared decision making, imaging was deferred. She reports minimal pain. She would like a work note. I discussed worsening concerns, she gave verbal understanding of discharge instructions and she is discharging home in stable condition.     I have reviewed the nursing notes.    I have reviewed the findings, diagnosis, plan and need for follow up with the patient.    Medical Decision Making  The patient's presentation was of low complexity (an acute and uncomplicated illness or injury).    The patient's evaluation involved:  history and exam without other MDM data elements    The patient's management necessitated only low risk treatment.      Discharge Medication List  as of 2/24/2025 11:58 AM          Final diagnoses:   Fall due to slipping on ice or snow, initial encounter   Closed head injury, initial encounter   Neck pain       2/24/2025   [facility]aleEmi, YESSI CNP  02/24/25 7150

## 2025-02-24 NOTE — DISCHARGE INSTRUCTIONS
Please return to be seen if : increased pain, nausea/vomiting, dizziness, weakness, numbness, trouble managing your bowels/bladder.   More information in hand out.

## 2025-03-09 ENCOUNTER — HEALTH MAINTENANCE LETTER (OUTPATIENT)
Age: 35
End: 2025-03-09